# Patient Record
Sex: FEMALE | Race: ASIAN
[De-identification: names, ages, dates, MRNs, and addresses within clinical notes are randomized per-mention and may not be internally consistent; named-entity substitution may affect disease eponyms.]

---

## 2019-07-15 ENCOUNTER — HOSPITAL ENCOUNTER (OUTPATIENT)
Dept: HOSPITAL 88 - ER | Age: 19
Setting detail: OBSERVATION
LOS: 2 days | Discharge: HOME | End: 2019-07-17
Attending: SURGERY | Admitting: SURGERY
Payer: COMMERCIAL

## 2019-07-15 VITALS — SYSTOLIC BLOOD PRESSURE: 93 MMHG | DIASTOLIC BLOOD PRESSURE: 55 MMHG

## 2019-07-15 VITALS — HEIGHT: 63 IN | BODY MASS INDEX: 23.61 KG/M2 | WEIGHT: 133.25 LBS

## 2019-07-15 DIAGNOSIS — Z01.812: ICD-10-CM

## 2019-07-15 DIAGNOSIS — F32.9: ICD-10-CM

## 2019-07-15 DIAGNOSIS — N83.01: ICD-10-CM

## 2019-07-15 DIAGNOSIS — K36: Primary | ICD-10-CM

## 2019-07-15 DIAGNOSIS — K35.80: ICD-10-CM

## 2019-07-15 LAB
ALBUMIN SERPL-MCNC: 3.7 G/DL (ref 3.5–5)
ALBUMIN/GLOB SERPL: 1.3 {RATIO} (ref 0.8–2)
ALP SERPL-CCNC: 44 IU/L (ref 40–150)
ALT SERPL-CCNC: 6 IU/L (ref 0–55)
ANION GAP SERPL CALC-SCNC: 11.7 MMOL/L (ref 8–16)
BACTERIA URNS QL MICRO: (no result) /HPF
BASOPHILS # BLD AUTO: 0 10*3/UL (ref 0–0.1)
BASOPHILS NFR BLD AUTO: 0.1 % (ref 0–1)
BILIRUB UR QL: NEGATIVE
BUN SERPL-MCNC: 14 MG/DL (ref 7–26)
BUN/CREAT SERPL: 18 (ref 6–25)
CALCIUM SERPL-MCNC: 8.8 MG/DL (ref 8.4–10.2)
CHLORIDE SERPL-SCNC: 103 MMOL/L (ref 98–107)
CLARITY UR: (no result)
CO2 SERPL-SCNC: 25 MMOL/L (ref 22–29)
COLOR UR: YELLOW
DEPRECATED NEUTROPHILS # BLD AUTO: 11.2 10*3/UL (ref 2.1–6.9)
DEPRECATED RBC URNS MANUAL-ACNC: (no result) /HPF (ref 0–5)
EGFRCR SERPLBLD CKD-EPI 2021: > 60 ML/MIN (ref 60–?)
EOSINOPHIL # BLD AUTO: 0.6 10*3/UL (ref 0–0.4)
EOSINOPHIL NFR BLD AUTO: 4.3 % (ref 0–6)
EPI CELLS URNS QL MICRO: (no result) /LPF
ERYTHROCYTE [DISTWIDTH] IN CORD BLOOD: 11.1 % (ref 11.7–14.4)
GLOBULIN PLAS-MCNC: 2.9 G/DL (ref 2.3–3.5)
GLUCOSE SERPLBLD-MCNC: 74 MG/DL (ref 74–118)
HCG UR QL: NEGATIVE
HCT VFR BLD AUTO: 37.8 % (ref 34.2–44.1)
HGB BLD-MCNC: 12.9 G/DL (ref 12–16)
KETONES UR QL STRIP.AUTO: NEGATIVE
LEUKOCYTE ESTERASE UR QL STRIP.AUTO: NEGATIVE
LYMPHOCYTES # BLD: 1.3 10*3/UL (ref 1–3.2)
LYMPHOCYTES NFR BLD AUTO: 9.5 % (ref 18–39.1)
MCH RBC QN AUTO: 31.7 PG (ref 28–32)
MCHC RBC AUTO-ENTMCNC: 34.1 G/DL (ref 31–35)
MCV RBC AUTO: 92.9 FL (ref 81–99)
MONOCYTES # BLD AUTO: 0.8 10*3/UL (ref 0.2–0.8)
MONOCYTES NFR BLD AUTO: 5.4 % (ref 4.4–11.3)
MUCOUS THREADS URNS QL MICRO: (no result)
NEUTS SEG NFR BLD AUTO: 80.3 % (ref 38.7–80)
NITRITE UR QL STRIP.AUTO: NEGATIVE
PLATELET # BLD AUTO: 201 X10E3/UL (ref 140–360)
POTASSIUM SERPL-SCNC: 3.7 MMOL/L (ref 3.5–5.1)
PROT UR QL STRIP.AUTO: NEGATIVE
RBC # BLD AUTO: 4.07 X10E6/UL (ref 3.6–5.1)
SODIUM SERPL-SCNC: 136 MMOL/L (ref 136–145)
SP GR UR STRIP: 1.02 (ref 1.01–1.02)
UROBILINOGEN UR STRIP-MCNC: 0.2 MG/DL (ref 0.2–1)
WBC #/AREA URNS HPF: (no result) /HPF (ref 0–5)

## 2019-07-15 PROCEDURE — 36415 COLL VENOUS BLD VENIPUNCTURE: CPT

## 2019-07-15 PROCEDURE — 85025 COMPLETE CBC W/AUTO DIFF WBC: CPT

## 2019-07-15 PROCEDURE — 88342 IMHCHEM/IMCYTCHM 1ST ANTB: CPT

## 2019-07-15 PROCEDURE — 87075 CULTR BACTERIA EXCEPT BLOOD: CPT

## 2019-07-15 PROCEDURE — 88313 SPECIAL STAINS GROUP 2: CPT

## 2019-07-15 PROCEDURE — 81001 URINALYSIS AUTO W/SCOPE: CPT

## 2019-07-15 PROCEDURE — 74177 CT ABD & PELVIS W/CONTRAST: CPT

## 2019-07-15 PROCEDURE — 49322 LAPAROSCOPY ASPIRATION: CPT

## 2019-07-15 PROCEDURE — 87071 CULTURE AEROBIC QUANT OTHER: CPT

## 2019-07-15 PROCEDURE — 88312 SPECIAL STAINS GROUP 1: CPT

## 2019-07-15 PROCEDURE — 88304 TISSUE EXAM BY PATHOLOGIST: CPT

## 2019-07-15 PROCEDURE — 80048 BASIC METABOLIC PNL TOTAL CA: CPT

## 2019-07-15 PROCEDURE — 99284 EMERGENCY DEPT VISIT MOD MDM: CPT

## 2019-07-15 PROCEDURE — 81025 URINE PREGNANCY TEST: CPT

## 2019-07-15 PROCEDURE — 87205 SMEAR GRAM STAIN: CPT

## 2019-07-15 PROCEDURE — 80053 COMPREHEN METABOLIC PANEL: CPT

## 2019-07-15 RX ADMIN — SODIUM CHLORIDE SCH MLS/HR: 9 INJECTION, SOLUTION INTRAVENOUS at 22:50

## 2019-07-15 RX ADMIN — METRONIDAZOLE SCH MG: 500 INJECTION, SOLUTION INTRAVENOUS at 22:50

## 2019-07-15 NOTE — XMS REPORT
Patient Summary Document

                             Created on: 07/15/2019



AMANDA GARCIA

External Reference #: [id number]

: 2000

Sex: Female



Demographics







                          Address                   [address] [place] [address]  [id number]

 

                          Home Phone                [phone]

 

                          Preferred Language        Unknown

 

                          Marital Status            Unknown

 

                          Quaker Affiliation     Unknown

 

                          Race                      Unknown

 

                                        Additional Race(s)  

 

                          Ethnic Group              Unknown





Author







                          Author                    [facility]

 

                          [facility]

 

                          Address                   Unknown

 

                          Phone                     Unavailable







Care Team Providers







                    Care Team Member Name    Role                Phone

 

                    DONALD PALACIO    Unavailable         Unavailable







Problems

This patient has no known problems.



Allergies, Adverse Reactions, Alerts

This patient has no known allergies or adverse reactions.



Medications

This patient has no known medications.



Results







           Test Description    Test Time    Test Comments    Text Results    Atomic Results    Result

 Comments

 

                CT ABDOMEN/PELVIS W    2019-07-15 19:26:00                                                       

                                                   [place] [address]      Patient Name: AMANDA GARCIA                                   MR 
#: [id number]                     : 2000                               
   Age/Sex: 19/F  Acct #: [id number]                              Req #: [id number]
[id number]  Adm Physician:                                                      
Ordered by: FELICIA ALEXANDER NP                            Report #: [id number]  
     Location: ER                                      Room/Bed:                
    
___________________________________________________________________________________________________
   Procedure: [id number] CT/CT ABDOMEN/PELVIS W  Exam Date: 07/15/19             
              Exam Time:                                               
REPORT STATUS: Signed    EXAM: CT Abdomen and Pelvis WITH contrast     
INDICATION: Abdominal pain. Appendicitis. Ovarian abscess. Right lower quadrant 
 pain.         COMPARISON: None.   TECHNIQUE: Abdomen and pelvis were scanned 
utilizing a multidetector helical   scanner from the lung base to the pubic 
symphysis after administration of IV   contrast. Coronal and sagittal 
reformations were obtained. Routine protocol was   performed. Scan was performed
when during portal venous phase.            IV CONTRAST: 100 mL of Isovue-370   
    ORAL CONTRAST: Water                  COMPLICATIONS: None      RADIATION 
DOSE:        Total DLP: 219.7 mGy*cm        Estimated effective dose: (DLP x 
0.015 x size factor) mSv        CTDIvol has been reviewed. It is below the 
limits set by the Radiation   Protocol Committee (RPC).        Dose modulation, 
iterative reconstruction, and/or weight based adjustment   of the mA/kV was 
utilized to reduce the radiation dose to as low as reasonably   achievable.     
 FINDINGS:      LINES and TUBES: None.      LOWER THORAX:  Unremarkable      
HEPATOBILIARY:      No focal hepatic lesions. No biliary ductal dilation.       
GALLBLADDER: No radio-opaque stones or sludge.  No wall thickening.      SPLEEN:
No splenomegaly.       PANCREAS: No focal masses or ductal dilatation.        
ADRENALS: No adrenal nodules          KIDNEYS/URETERS: Kidneys enhance 
symmetrically.  No hydronephrosis. No cystic   or solid mass lesions.  No 
stones.      GI TRACT: No abnormal distention, wall thickening, or evidence of 
bowel   obstruction.       Thickened appendix with inflammatory change in the 
right   lower quadrant of the abdomen consistent with acute appendicitis. No 
definite   perforation or abscess. Prominent lymph nodes in the right lower 
quadrant of   the abdomen likely reactive.      PELVIC ORGANS/BLADDER: Likely 
right corpus luteal cyst. Small left adnexal   cyst..      LYMPH NODES: No 
lymphadenopathy.      VESSELS: Unremarkable.      PERITONEUM / RETROPERITONEUM: 
Free fluid in the pelvis. No free air is seen.      BONES: Unremarkable.      
SOFT TISSUES: Unremarkable.                  IMPRESSION:    Thickened appendix 
with inflammatory change in the right lower quadrant of the   abdomen consistent
with acute appendicitis. No definite perforation or abscess.   Prominent lymph 
nodes in the right lower quadrant of the abdomen likely   reactive.      
Findings discussed with the ER physician Dr. Alexander by Dr. Child on 7/15/2019 at  
7:30 PM      Signed by: Dr. Cj Child M.D. on 7/15/2019 7:37 PM        
Dictated By: CJ CHILD MD, MD  Electronically Signed By: CJ CHILD MD, MD 
on 07/15/19 1937  Transcribed By: YASEMIN on 07/15/19 1937       COPY TO:   
FELICIA ALEXANDER NP

## 2019-07-15 NOTE — NUR
CONSENT COMPLETED AT THIS TIME. BOYFRIEND AT BEDSIDE, PATIENT VERBALIZED PERMISSION FOR HIM 
TO STAY IN ROOM TO DISCUSS HISTORY, CURRENT HEALTH, AND PLAN MOVING FORWARD.

## 2019-07-15 NOTE — DIAGNOSTIC IMAGING REPORT
EXAM: CT Abdomen and Pelvis WITH contrast  

INDICATION: Abdominal pain. Appendicitis. Ovarian abscess. Right lower quadrant

pain.      

COMPARISON: None.

TECHNIQUE: Abdomen and pelvis were scanned utilizing a multidetector helical

scanner from the lung base to the pubic symphysis after administration of IV

contrast. Coronal and sagittal reformations were obtained. Routine protocol was

performed. Scan was performed when during portal venous phase.    

     IV CONTRAST: 100 mL of Isovue-370

     ORAL CONTRAST: Water

            

COMPLICATIONS: None



RADIATION DOSE:

     Total DLP: 219.7 mGy*cm

     Estimated effective dose: (DLP x 0.015 x size factor) mSv

     CTDIvol has been reviewed. It is below the limits set by the Radiation

Protocol Committee (RPC).

     Dose modulation, iterative reconstruction, and/or weight based adjustment

of the mA/kV was utilized to reduce the radiation dose to as low as reasonably

achievable. 



FINDINGS:



LINES and TUBES: None.



LOWER THORAX:  Unremarkable



HEPATOBILIARY:      No focal hepatic lesions. No biliary ductal dilation. 



GALLBLADDER: No radio-opaque stones or sludge.  No wall thickening.



SPLEEN: No splenomegaly. 



PANCREAS: No focal masses or ductal dilatation.  



ADRENALS: No adrenal nodules    



KIDNEYS/URETERS: Kidneys enhance symmetrically.  No hydronephrosis. No cystic

or solid mass lesions.  No stones.



GI TRACT: No abnormal distention, wall thickening, or evidence of bowel

obstruction.       Thickened appendix with inflammatory change in the right

lower quadrant of the abdomen consistent with acute appendicitis. No definite

perforation or abscess. Prominent lymph nodes in the right lower quadrant of

the abdomen likely reactive.



PELVIC ORGANS/BLADDER: Likely right corpus luteal cyst. Small left adnexal

cyst..



LYMPH NODES: No lymphadenopathy.



VESSELS: Unremarkable.



PERITONEUM / RETROPERITONEUM: Free fluid in the pelvis. No free air is seen.



BONES: Unremarkable.



SOFT TISSUES: Unremarkable.            



IMPRESSION: 

Thickened appendix with inflammatory change in the right lower quadrant of the

abdomen consistent with acute appendicitis. No definite perforation or abscess.

Prominent lymph nodes in the right lower quadrant of the abdomen likely

reactive.



Findings discussed with the ER physician Dr. Mike by Dr. Child on 7/15/2019 at

7:30 PM



Signed by: Dr. Cj Child M.D. on 7/15/2019 7:37 PM

## 2019-07-15 NOTE — NUR
RECEIVED PATIENT FROM ER AT THIS TIME VIA WHEELCHAIR. PATIENT AMBULATED TO BED. LUNG SOUNDS 
CLEAR. BOWEL SOUNDS ACTIVE. PATIENT REPORTS PAIN AS 5-6 WHEN NOT MOVING AND 6-8 WHEN MOVING. 
LAST BM 7/15. SKIN INTACT. R AC 20G IV ASYMPTOMATIC, INTACT, AND PATENT. BED LOCKED IN 
LOWEST POSITION, SIDE RAILS UPX2, CALL LIGHT IN REACH.

## 2019-07-16 VITALS — DIASTOLIC BLOOD PRESSURE: 46 MMHG | SYSTOLIC BLOOD PRESSURE: 85 MMHG

## 2019-07-16 VITALS — DIASTOLIC BLOOD PRESSURE: 55 MMHG | SYSTOLIC BLOOD PRESSURE: 93 MMHG

## 2019-07-16 VITALS — SYSTOLIC BLOOD PRESSURE: 83 MMHG | DIASTOLIC BLOOD PRESSURE: 53 MMHG

## 2019-07-16 VITALS — DIASTOLIC BLOOD PRESSURE: 61 MMHG | SYSTOLIC BLOOD PRESSURE: 85 MMHG

## 2019-07-16 VITALS — SYSTOLIC BLOOD PRESSURE: 96 MMHG | DIASTOLIC BLOOD PRESSURE: 59 MMHG

## 2019-07-16 VITALS — DIASTOLIC BLOOD PRESSURE: 53 MMHG | SYSTOLIC BLOOD PRESSURE: 86 MMHG

## 2019-07-16 LAB
ALBUMIN SERPL-MCNC: 2.9 G/DL (ref 3.5–5)
ALBUMIN/GLOB SERPL: 1 {RATIO} (ref 0.8–2)
ALP SERPL-CCNC: 40 IU/L (ref 40–150)
ALT SERPL-CCNC: < 6 IU/L (ref 0–55)
ANION GAP SERPL CALC-SCNC: 8.9 MMOL/L (ref 8–16)
BASOPHILS # BLD AUTO: 0 10*3/UL (ref 0–0.1)
BASOPHILS NFR BLD AUTO: 0.2 % (ref 0–1)
BUN SERPL-MCNC: 14 MG/DL (ref 7–26)
BUN/CREAT SERPL: 20 (ref 6–25)
CALCIUM SERPL-MCNC: 8.3 MG/DL (ref 8.4–10.2)
CHLORIDE SERPL-SCNC: 108 MMOL/L (ref 98–107)
CO2 SERPL-SCNC: 23 MMOL/L (ref 22–29)
DEPRECATED NEUTROPHILS # BLD AUTO: 7.5 10*3/UL (ref 2.1–6.9)
EGFRCR SERPLBLD CKD-EPI 2021: > 60 ML/MIN (ref 60–?)
EOSINOPHIL # BLD AUTO: 0.7 10*3/UL (ref 0–0.4)
EOSINOPHIL NFR BLD AUTO: 6.3 % (ref 0–6)
ERYTHROCYTE [DISTWIDTH] IN CORD BLOOD: 11.2 % (ref 11.7–14.4)
GLOBULIN PLAS-MCNC: 3 G/DL (ref 2.3–3.5)
GLUCOSE SERPLBLD-MCNC: 63 MG/DL (ref 74–118)
HCT VFR BLD AUTO: 33.2 % (ref 34.2–44.1)
HGB BLD-MCNC: 10.9 G/DL (ref 12–16)
LYMPHOCYTES # BLD: 1.8 10*3/UL (ref 1–3.2)
LYMPHOCYTES NFR BLD AUTO: 16.9 % (ref 18–39.1)
MCH RBC QN AUTO: 31 PG (ref 28–32)
MCHC RBC AUTO-ENTMCNC: 32.8 G/DL (ref 31–35)
MCV RBC AUTO: 94.3 FL (ref 81–99)
MONOCYTES # BLD AUTO: 0.8 10*3/UL (ref 0.2–0.8)
MONOCYTES NFR BLD AUTO: 7.4 % (ref 4.4–11.3)
NEUTS SEG NFR BLD AUTO: 68.9 % (ref 38.7–80)
PLATELET # BLD AUTO: 195 X10E3/UL (ref 140–360)
POTASSIUM SERPL-SCNC: 3.9 MMOL/L (ref 3.5–5.1)
RBC # BLD AUTO: 3.52 X10E6/UL (ref 3.6–5.1)
SODIUM SERPL-SCNC: 136 MMOL/L (ref 136–145)

## 2019-07-16 RX ADMIN — METRONIDAZOLE SCH MG: 500 INJECTION, SOLUTION INTRAVENOUS at 22:40

## 2019-07-16 RX ADMIN — METRONIDAZOLE SCH MG: 500 INJECTION, SOLUTION INTRAVENOUS at 04:58

## 2019-07-16 RX ADMIN — SODIUM CHLORIDE PRN MG: 900 INJECTION INTRAVENOUS at 18:43

## 2019-07-16 RX ADMIN — SODIUM CHLORIDE SCH MLS/HR: 9 INJECTION, SOLUTION INTRAVENOUS at 03:50

## 2019-07-16 RX ADMIN — SODIUM CHLORIDE PRN MG: 900 INJECTION INTRAVENOUS at 00:35

## 2019-07-16 RX ADMIN — METRONIDAZOLE SCH MG: 500 INJECTION, SOLUTION INTRAVENOUS at 14:07

## 2019-07-16 RX ADMIN — SODIUM CHLORIDE SCH MLS/HR: 9 INJECTION, SOLUTION INTRAVENOUS at 12:00

## 2019-07-16 RX ADMIN — HYDROCODONE BITARTRATE AND ACETAMINOPHEN PRN EA: 7.5; 325 TABLET ORAL at 18:43

## 2019-07-16 NOTE — NUR
Rcvd patient in report this am. Patient is asleep in bed at this time. No s/s of distress 
noted. Patient is NPO for surgery today

## 2019-07-16 NOTE — OPERATIVE REPORT
DATE OF PROCEDURE:  07/16/2019

 

SURGEON:  Amilcar Harmon MD

 

PREOPERATIVE DIAGNOSIS:  Acute appendicitis.

 

POSTOPERATIVE DIAGNOSIS:  Final path report pending.

 

PROCEDURE PERFORMED:  Diagnostic laparoscopy and laparoscopic appendectomy.

 

ANESTHESIA:  General.

 

ESTIMATED BLOOD LOSS:  Minimal.

 

DRAINS:  None.

 

COMPLICATIONS:  None.

 

INDICATION AND FINDINGS:  The patient is a 19-year-old female admitted complaining of

abdominal pain since Friday.  The pain was reported as having started in the

periumbilical area and then later localized into the right lower quadrant.  She vomited

twice on Friday.  No diarrhea.  No vomiting.  No similar episodes.  The patient

presented to the emergency room at [facility], where she had a CT scan that revealed

acute appendicitis.  No other pathology was found.  Her white count was 21545.  Physical

examination revealed a 19-year-old female, in no acute distress.  Had some mild right

lower quadrant tenderness without any peritoneal findings. 

 

INTRAOPERATIVE FINDINGS:  The patient's appendix appeared to be grossly normal.  There

was some increased vascularity in the area of the tip, however, this does not correspond

to the findings described in the CT scan.  Because of this, I performed a diagnostic

laparoscopy.  The small bowel was run in its entirety from the ligament of Treitz down

to the terminal ileum and it appeared to be normal so was the cecum and the ascending

colon.  The pelvic, the tubes, and ovaries were normal as was the uterus.  There was

some serous fluid in the cul-de-sac.  There was what appeared to have been a follicular

cyst on the right ovary.  The fluid in the pelvis was aspirated, irrigated, suctioned

and sent for culture and sensitivity. 

 

DESCRIPTION OF PROCEDURE:  With the patient lying on the operative table in the supine

position and after administered general anesthesia, she was prepped and draped for

laparoscopic appendectomy.  The procedure was begun by establishing the pneumoperitoneum

in the umbilical site after stab wound was made in that location and the saline drop

test was performed and pneumoperitoneum was insufflated to 15 mm of pressure and then

the 11/12 trocar was placed in that location.  The patient was placed with the head-down

position and rotated to the left and then we placed 2 lateral working ports in the right

lower quadrant and right upper quadrant.  Using the 5 mm trocar, the appendix was

initially visualized and then it was mobilized through the two 5 mm trocars.  A rent was

made in the mesoappendix and appendectomy was performed by firing of Endo-EAN using the

blue load at the appendiceal cecal junction and firing the same stapler using the white

load.  The appendix was placed in an endobag and removed.  We then again performed the

previously described diagnostic laparoscopy and findings noted above and is to be

mentioned that the gallbladder appeared normal without any signs of inflammation and the

liver appeared free of any masses.  At this point, we aspirated the fluid in the pelvis,

irrigated the pelvis until the effluent was clear and inspected the operative field.  We

irrigated the staple lines, which were intact and there was no bleeding.  No evidence of

bowel injury and at that point, we released the pneumoperitoneum and closed the wound

using 0-Vicryl for the umbilical fascia and 3-0 Vicryl for the subcutaneous tissue in

that location and then the 

skin of all the ports were closed with a 5-0 subcuticular Vicryl.  Sterile dressing was

applied.  The patient tolerated the procedure well and was taken to recovery room in

stable condition. 

 

 

 

 

______________________________

MD JABARI Butler/EMMANUEL

D:  07/16/2019 13:09:48

T:  07/16/2019 13:44:49

Job #:  [id number]

## 2019-07-16 NOTE — NUR
Patient returned from OR at this time. 3 trochar sites noted. Covered with bandaids. No c/o 
pain at this time. Patient resting in bed. Diet advanced to clear liquids. No s/s of 
distress noted

## 2019-07-16 NOTE — NUR
RECEIVED PATIENT IN REPORT. PATIENT RESTING IN BED, SCDS ON. MINIMAL PAIN REPORTED, 3/10. 
PATIENT A&oX4. NO S&S OF DISTRESS NOTED. R AC 20G IV ASYMPTOMATIC, INTACT, AND PATENT. BED 
LOCKED IN LOWEST POSITION, SIDE RAILS UPX2, CALL LIGHT IN REACH. FAMILY MEMBERS AT BEDSIDE.

## 2019-07-16 NOTE — NUR
Visit made by the Spiritual Care Department Pastoral Visitor, Naomy Braswell.  Pt sleeping 
soundly and no family present.  Pastoral Visitor left a card describing availability of 
 and instructions on how to contact a .



DARSHAN WALLS



Spiritual Care Department

O: [phone]

Pager: [phone])

## 2019-07-17 VITALS — SYSTOLIC BLOOD PRESSURE: 92 MMHG | DIASTOLIC BLOOD PRESSURE: 57 MMHG

## 2019-07-17 VITALS — SYSTOLIC BLOOD PRESSURE: 95 MMHG | DIASTOLIC BLOOD PRESSURE: 54 MMHG

## 2019-07-17 VITALS — DIASTOLIC BLOOD PRESSURE: 61 MMHG | SYSTOLIC BLOOD PRESSURE: 105 MMHG

## 2019-07-17 LAB
ANION GAP SERPL CALC-SCNC: 10.3 MMOL/L (ref 8–16)
BASOPHILS # BLD AUTO: 0 10*3/UL (ref 0–0.1)
BASOPHILS NFR BLD AUTO: 0.2 % (ref 0–1)
BUN SERPL-MCNC: 8 MG/DL (ref 7–26)
BUN/CREAT SERPL: 12 (ref 6–25)
CALCIUM SERPL-MCNC: 8.3 MG/DL (ref 8.4–10.2)
CHLORIDE SERPL-SCNC: 110 MMOL/L (ref 98–107)
CO2 SERPL-SCNC: 22 MMOL/L (ref 22–29)
DEPRECATED NEUTROPHILS # BLD AUTO: 10.5 10*3/UL (ref 2.1–6.9)
EGFRCR SERPLBLD CKD-EPI 2021: > 60 ML/MIN (ref 60–?)
EOSINOPHIL # BLD AUTO: 0.1 10*3/UL (ref 0–0.4)
EOSINOPHIL NFR BLD AUTO: 1 % (ref 0–6)
ERYTHROCYTE [DISTWIDTH] IN CORD BLOOD: 11.2 % (ref 11.7–14.4)
GLUCOSE SERPLBLD-MCNC: 93 MG/DL (ref 74–118)
HCT VFR BLD AUTO: 30.9 % (ref 34.2–44.1)
HGB BLD-MCNC: 10.4 G/DL (ref 12–16)
LYMPHOCYTES # BLD: 1.4 10*3/UL (ref 1–3.2)
LYMPHOCYTES NFR BLD AUTO: 11.1 % (ref 18–39.1)
MCH RBC QN AUTO: 31.3 PG (ref 28–32)
MCHC RBC AUTO-ENTMCNC: 33.7 G/DL (ref 31–35)
MCV RBC AUTO: 93.1 FL (ref 81–99)
MONOCYTES # BLD AUTO: 0.9 10*3/UL (ref 0.2–0.8)
MONOCYTES NFR BLD AUTO: 7 % (ref 4.4–11.3)
NEUTS SEG NFR BLD AUTO: 80.2 % (ref 38.7–80)
PLATELET # BLD AUTO: 196 X10E3/UL (ref 140–360)
POTASSIUM SERPL-SCNC: 4.3 MMOL/L (ref 3.5–5.1)
RBC # BLD AUTO: 3.32 X10E6/UL (ref 3.6–5.1)
SODIUM SERPL-SCNC: 138 MMOL/L (ref 136–145)

## 2019-07-17 RX ADMIN — METRONIDAZOLE SCH MG: 500 INJECTION, SOLUTION INTRAVENOUS at 04:00

## 2019-07-17 RX ADMIN — HYDROCODONE BITARTRATE AND ACETAMINOPHEN PRN EA: 7.5; 325 TABLET ORAL at 01:04

## 2019-07-17 RX ADMIN — SODIUM CHLORIDE SCH MLS/HR: 9 INJECTION, SOLUTION INTRAVENOUS at 03:55

## 2019-07-17 RX ADMIN — SODIUM CHLORIDE SCH MLS/HR: 9 INJECTION, SOLUTION INTRAVENOUS at 11:50

## 2019-07-17 RX ADMIN — SODIUM CHLORIDE SCH MLS/HR: 9 INJECTION, SOLUTION INTRAVENOUS at 03:50

## 2019-07-17 NOTE — NUR
PATIENT REPORTED SOME BLEEDING FROM TROCHAR SITE AT [facility]. PATIENT HAD PUT [employer] PIERCING 
BACK IN, WHICH SEEMED TO BE PRESSING ON SITE. PATIENT REMOVED PIERCING AND VERBALIZED 
UNDERSTANDING THAT IT SHOULD STAY OUT UNTIL SITE HAD HEALED MORE. ORIGINAL BANDAID LEFT IN 
PLACE, REINFORCED WITH GAUZE OVER TOP, SECURED WITH TAPE. WILL MONITOR.

## 2019-07-17 NOTE — NUR
Patient discharged from facility to home. Patient assisted out via staff. Reviewed discharge 
instructions, follow up appts, and RX's given. Patient verbalized understanding. Mother at 
bedside

## 2019-07-17 NOTE — NUR
Patient is AAox3. Patient is post op lap appendectomy. Dressings clean and dry. NO c/o pain. 
Lung fields clear to auscultation. Bowel sounds present x4. Patient passing gas at this 
time. No s/s of distress noted

## 2020-03-20 NOTE — PRE OP HISTORY & PHYSICAL
CHIEF COMPLAINT:  Abdominal pain.

 

HISTORY OF PRESENT ILLNESS:  The patient is an otherwise healthy 19-year-old female, who

developed abdominal pain beginning in the periumbilical region, later localized into the

right lower quadrant on Friday.  No fever.  No chills.  The patient experienced vomiting

on Friday, none after that.  Because of the persistence of the pain, she came to the

emergency room, where she had a CT scan that revealed acute appendicitis.  White count

was 13,000.  Electrolytes were normal. 

 

PAST MEDICAL HISTORY:  History of depression, for which she is takes Sertraline.

 

SURGICAL HISTORY:  Consists of right hand surgery.  She has no medical conditions.

 

MEDICATIONS:  Sertraline as previously stated.

 

ALLERGIES:  SHE HAS NO KNOWN ALLERGIES.

 

SOCIAL HISTORY:  The patient does not drink, does not smoke.

 

REVIEW OF SYSTEMS:

Significant for what has already been stated.

 

PHYSICAL EXAMINATION:

GENERAL:  Reveals a 19-year-old female, in no acute distress. 

HEAD, EYES, EARS, NOSE, AND THROAT:  Reveals no acute process. 

NECK:  Supple. 

LUNGS:  Clear. 

HEART:  Reveals regular sinus rhythm. 

ABDOMEN:  Reveal right lower quadrant tenderness with no rebound.  Bowel sounds are

present. 

EXTREMITIES:  Reveal no clubbing, cyanosis, or edema. 

NEUROLOGIC:  Nonfocal.

LABORATORY DATA:  Admission labs and x-rays have already been discussed.

 

ASSESSMENT:  Appendicitis.

 

PLAN:  To proceed with appendectomy.  Hydrate.  Give antibiotics.  The plans have been

discussed with the patient and she agrees with surgical plans. 

 

 

 

 

______________________________

MD JABARI Butler/EMMANUEL

D:  07/15/2019 21:53:23

T:  07/15/2019 22:07:22

Job #:  [id number]
No